# Patient Record
Sex: FEMALE | Race: WHITE | Employment: FULL TIME | URBAN - METROPOLITAN AREA
[De-identification: names, ages, dates, MRNs, and addresses within clinical notes are randomized per-mention and may not be internally consistent; named-entity substitution may affect disease eponyms.]

---

## 2019-06-03 ENCOUNTER — HOSPITAL ENCOUNTER (EMERGENCY)
Age: 29
Discharge: HOME | End: 2019-06-03
Payer: COMMERCIAL

## 2019-06-03 VITALS
TEMPERATURE: 98 F | SYSTOLIC BLOOD PRESSURE: 126 MMHG | BODY MASS INDEX: 24.75 KG/M2 | DIASTOLIC BLOOD PRESSURE: 75 MMHG | HEIGHT: 64 IN | WEIGHT: 145 LBS | RESPIRATION RATE: 15 BRPM | HEART RATE: 65 BPM | OXYGEN SATURATION: 100 %

## 2019-06-03 DIAGNOSIS — R11.0 NAUSEA: ICD-10-CM

## 2019-06-03 DIAGNOSIS — N30.00 ACUTE CYSTITIS WITHOUT HEMATURIA: ICD-10-CM

## 2019-06-03 DIAGNOSIS — F41.9 ANXIETY: Primary | ICD-10-CM

## 2019-06-03 LAB
BACTERIA: ABNORMAL /HPF
BILIRUBIN URINE: NEGATIVE
BLOOD, URINE: NEGATIVE
CLARITY: CLEAR
COLOR: ABNORMAL
EPITHELIAL CELLS, UA: ABNORMAL /HPF
GLUCOSE URINE: NEGATIVE MG/DL
HCG(URINE) PREGNANCY TEST: NEGATIVE
KETONES, URINE: NEGATIVE MG/DL
LEUKOCYTE ESTERASE, URINE: ABNORMAL
MICROSCOPIC EXAMINATION: YES
NITRITE, URINE: NEGATIVE
PH UA: 7 (ref 5–8)
PROTEIN UA: NEGATIVE MG/DL
RBC UA: ABNORMAL /HPF (ref 0–2)
SPECIFIC GRAVITY UA: <=1.005 (ref 1–1.03)
URINE REFLEX TO CULTURE: YES
URINE TYPE: ABNORMAL
UROBILINOGEN, URINE: 0.2 E.U./DL
WBC UA: ABNORMAL /HPF (ref 0–5)

## 2019-06-03 PROCEDURE — 6370000000 HC RX 637 (ALT 250 FOR IP): Performed by: NURSE PRACTITIONER

## 2019-06-03 PROCEDURE — 99283 EMERGENCY DEPT VISIT LOW MDM: CPT

## 2019-06-03 PROCEDURE — 87086 URINE CULTURE/COLONY COUNT: CPT

## 2019-06-03 PROCEDURE — 81001 URINALYSIS AUTO W/SCOPE: CPT

## 2019-06-03 PROCEDURE — 84703 CHORIONIC GONADOTROPIN ASSAY: CPT

## 2019-06-03 RX ORDER — HYDROXYZINE HYDROCHLORIDE 10 MG/1
25 TABLET, FILM COATED ORAL ONCE
Status: COMPLETED | OUTPATIENT
Start: 2019-06-03 | End: 2019-06-03

## 2019-06-03 RX ORDER — NITROFURANTOIN 25; 75 MG/1; MG/1
100 CAPSULE ORAL ONCE
Status: COMPLETED | OUTPATIENT
Start: 2019-06-03 | End: 2019-06-03

## 2019-06-03 RX ORDER — HYDROXYZINE PAMOATE 25 MG/1
25 CAPSULE ORAL ONCE
Status: DISCONTINUED | OUTPATIENT
Start: 2019-06-03 | End: 2019-06-03

## 2019-06-03 RX ORDER — HYDROXYZINE 50 MG/1
25 TABLET, FILM COATED ORAL EVERY 6 HOURS PRN
Qty: 10 TABLET | Refills: 0 | Status: SHIPPED | OUTPATIENT
Start: 2019-06-03 | End: 2019-06-13

## 2019-06-03 RX ORDER — DICYCLOMINE HYDROCHLORIDE 10 MG/1
10 CAPSULE ORAL ONCE
Status: COMPLETED | OUTPATIENT
Start: 2019-06-03 | End: 2019-06-03

## 2019-06-03 RX ORDER — ONDANSETRON 4 MG/1
4 TABLET, ORALLY DISINTEGRATING ORAL EVERY 8 HOURS PRN
Qty: 10 TABLET | Refills: 0 | Status: SHIPPED | OUTPATIENT
Start: 2019-06-03

## 2019-06-03 RX ORDER — DICYCLOMINE HYDROCHLORIDE 10 MG/1
10 CAPSULE ORAL EVERY 6 HOURS PRN
Qty: 10 CAPSULE | Refills: 0 | Status: SHIPPED | OUTPATIENT
Start: 2019-06-03

## 2019-06-03 RX ORDER — NITROFURANTOIN 25; 75 MG/1; MG/1
100 CAPSULE ORAL 2 TIMES DAILY
Qty: 10 CAPSULE | Refills: 0 | Status: SHIPPED | OUTPATIENT
Start: 2019-06-03 | End: 2019-06-08

## 2019-06-03 RX ORDER — ONDANSETRON 4 MG/1
4 TABLET, ORALLY DISINTEGRATING ORAL ONCE
Status: COMPLETED | OUTPATIENT
Start: 2019-06-03 | End: 2019-06-03

## 2019-06-03 RX ADMIN — DICYCLOMINE HYDROCHLORIDE 10 MG: 10 CAPSULE ORAL at 11:14

## 2019-06-03 RX ADMIN — HYDROXYZINE HYDROCHLORIDE 25 MG: 10 TABLET, FILM COATED ORAL at 11:14

## 2019-06-03 RX ADMIN — ONDANSETRON 4 MG: 4 TABLET, ORALLY DISINTEGRATING ORAL at 10:37

## 2019-06-03 RX ADMIN — NITROFURANTOIN MONOHYDRATE/MACROCRYSTALS 100 MG: 75; 25 CAPSULE ORAL at 11:14

## 2019-06-03 NOTE — ED NOTES
Spoke with Ruthie Smith and told her the order is in and please send ASAP.      Jeanie Bui, [address] [facility]  06/03/19 8834

## 2019-06-03 NOTE — ED PROVIDER NOTES
[facility] Emergency Department    CHIEF COMPLAINT  Anxiety (Patient states her Carol Lauren passed away in October and  she hasnt felt \"right\" since. Patient states shes more recluse and \"not feeling like herself\". Tearful upon triage. )      HISTORY OF PRESENT ILLNESS  Silver Stephens is a 34 y.o. female who presents to the ED complaining of \"not feeling like herself. \" Patient is very tearful during history and states \"I don't know what's wrong with me. \" Patient reports since October her grandfather patient is very close with passed away she has been feeling very anxious. Patient reports she does not want to go out or when necessary any friends or family. Patient also reports feeling nauseous and having episodes of diarrhea. Patient denies any history of depression or anxiety. Patient does not take any medications daily. Patient is not currently established with a primary care physician. Patient denies suicidal or homicidal thoughts. Patient states \"I just want to know what's wrong with me and feel like myself. \" Patient reports she just got back from a family vacation and when she went to work this morning she broke down into tears. Patient denies fever, chills, body aches, dizziness, syncope, headache, cough, chest pain, shortness of breath, abdominal pain, emesis, dysuria, hematuria, flank pain, leg swelling, calf pain, palpitations. Patient denies any known aggravating or alleviating factors. No other complaints, modifying factors or associated symptoms. Nursing notes reviewed.    Past Medical History:   Diagnosis Date    Gestational hypertension 11/3/2016     Past Surgical History:   Procedure Laterality Date    ADENOIDECTOMY       Family History   Problem Relation Age of Onset    Depression Mother     High Blood Pressure Mother     Mental Illness Mother     Diabetes Father     Cancer Maternal Grandmother     Early Death Paternal Grandmother      Social History Socioeconomic History    Marital status: Single     Spouse name: Not on file    Number of children: Not on file    Years of education: Not on file    Highest education level: Not on file   Occupational History    Not on file   Social Needs    Financial resource strain: Not on file    Food insecurity:     Worry: Not on file     Inability: Not on file    Transportation needs:     Medical: Not on file     Non-medical: Not on file   Tobacco Use    Smoking status: Former Smoker     Types: Cigarettes    Smokeless tobacco: Never Used   Substance and Sexual Activity    Alcohol use: No    Drug use: No    Sexual activity: Yes   Lifestyle    Physical activity:     Days per week: Not on file     Minutes per session: Not on file    Stress: Not on file   Relationships    Social connections:     Talks on phone: Not on file     Gets together: Not on file     Attends [employer]ism service: Not on file     Active member of club or organization: Not on file     Attends meetings of clubs or organizations: Not on file     Relationship status: Not on file    Intimate partner violence:     Fear of current or ex partner: Not on file     Emotionally abused: Not on file     Physically abused: Not on file     Forced sexual activity: Not on file   Other Topics Concern    Not on file   Social History Narrative    Not on file     No current facility-administered medications for this encounter.       Current Outpatient Medications   Medication Sig Dispense Refill    hydrOXYzine (ATARAX) 50 MG tablet Take 0.5 tablets by mouth every 6 hours as needed for Anxiety 10 tablet 0    ondansetron (ZOFRAN ODT) 4 MG disintegrating tablet Take 1 tablet by mouth every 8 hours as needed for Nausea 10 tablet 0    dicyclomine (BENTYL) 10 MG capsule Take 1 capsule by mouth every 6 hours as needed (cramps) 10 capsule 0    nitrofurantoin, macrocrystal-monohydrate, (MACROBID) 100 MG capsule Take 1 capsule by mouth 2 times daily for 5 days 10 Ketones, Urine Negative Negative mg/dL    Specific Gravity, UA <=1.005 1.005 - 1.030    Blood, Urine Negative Negative    pH, UA 7.0 5.0 - 8.0    Protein, UA Negative Negative mg/dL    Urobilinogen, Urine 0.2 <2.0 E.U./dL    Nitrite, Urine Negative Negative    Leukocyte Esterase, Urine TRACE (A) Negative    Microscopic Examination YES     Urine Reflex to Culture Yes     Urine Type Not Specified    Pregnancy, Urine   Result Value Ref Range    HCG(Urine) Pregnancy Test Negative Detects HCG level >20 MIU/mL   Microscopic Urinalysis   Result Value Ref Range    WBC, UA 10-20 (A) 0 - 5 /HPF    RBC, UA 0-2 0 - 2 /HPF    Epi Cells 0-2 /HPF    Bacteria, UA 1+ (A) /HPF       I estimate there is LOW risk for ACUTE CORONARY SYNDROME, INTRACRANIAL HEMORRHAGE, MALIGNANT DYSRHYTHMIA, MENINGITIS, PNEUMONIA, PULMONARY EMBOLISM, SEPSIS, SUBARACHNOID HEMORRHAGE, SUBDURAL HEMATOMA, STROKE, thus I consider the discharge disposition reasonable. Soham Mello and I have discussed the diagnosis and risks, and we agree with discharging home to follow-up with their primary doctor. We also discussed returning to the Emergency Department immediately if new or worsening symptoms occur. We have discussed the symptoms which are most concerning (e.g., changing or worsening pain, weakness, vomiting, fever) that necessitate immediate return. Final Impression    1. Anxiety    2. Nausea    3. Acute cystitis without hematuria        Blood pressure 126/75, pulse 65, temperature 98 °F (36.7 °C), temperature source Oral, resp. rate 15, height 5' 4\" (1.626 m), weight 145 lb (65.8 kg), last menstrual period 05/28/2019, SpO2 100 %, unknown if currently breastfeeding. DISPOSITION  Patient was discharged to home in good condition.          [address], APRN - CNP  06/03/19 1216

## 2019-06-03 NOTE — ED NOTES
Called pharmacy for the 2nd time and they state we have no capsules and the order needs to be changed to a tablet     Xin Acosta RN  06/03/19 7417

## 2019-06-03 NOTE — LETTER
[facility]  ED  [address] [facility] [address]  Phone: [phone]  Fax: [phone]             Griselda 3, 2019    Patient: Kvng Vincent   YOB: 1990   Date of Visit: 6/3/2019       To Whom It May Concern:    Kee Fang was seen and treated in our emergency department on 6/3/2019. She may return to work on 06/05/19.       Sincerely,     Nurse        Signature:__________________________________

## 2019-06-03 NOTE — ED NOTES
Pt has had some anxiety and depression since the passing of her grandfather. Also has frequent urination.      Itzel Orantes RN  06/03/19 0884

## 2019-06-04 LAB — URINE CULTURE, ROUTINE: NORMAL

## 2021-12-14 ENCOUNTER — INITIAL CONSULT (OUTPATIENT)
Dept: SURGERY | Age: 31
End: 2021-12-14
Payer: COMMERCIAL

## 2021-12-14 VITALS
DIASTOLIC BLOOD PRESSURE: 72 MMHG | SYSTOLIC BLOOD PRESSURE: 118 MMHG | WEIGHT: 128 LBS | BODY MASS INDEX: 21.85 KG/M2 | HEIGHT: 64 IN

## 2021-12-14 DIAGNOSIS — K64.4 EXTERNAL HEMORRHOIDS: Primary | ICD-10-CM

## 2021-12-14 PROCEDURE — 99242 OFF/OP CONSLTJ NEW/EST SF 20: CPT | Performed by: SURGERY

## 2021-12-14 PROCEDURE — G8420 CALC BMI NORM PARAMETERS: HCPCS | Performed by: SURGERY

## 2021-12-14 PROCEDURE — G8484 FLU IMMUNIZE NO ADMIN: HCPCS | Performed by: SURGERY

## 2021-12-14 PROCEDURE — G8427 DOCREV CUR MEDS BY ELIG CLIN: HCPCS | Performed by: SURGERY

## 2021-12-14 RX ORDER — HYDROXYZINE HYDROCHLORIDE 10 MG/1
10 TABLET, FILM COATED ORAL 3 TIMES DAILY PRN
COMMUNITY

## 2021-12-14 NOTE — PROGRESS NOTES
New Patient Via Harjinder Mayberry MD    [address], [address] [facility]  [place], [place] [facility]  [phone]    Debra Allen   YOB: 1990    Date of Visit:  12/14/2021      IBAN Hansen CNP    Chief Complaint: Hemorrhoids    HPI: Patient presents for evaluation of hemorrhoids. She states she has had fairly constant problems with these since her youngest child was born 15 years ago. She has intermittent problems with pain and bleeding. Hard bowel movements exacerbate this and also because bleeding. She states that hemorrhoids are on the outside and will occasionally get flared up and become extremely uncomfortable. She has issues with hygiene as well due to the prominence of the tissue back there    No Known Allergies  Outpatient Medications Marked as Taking for the 12/14/21 encounter (Initial consult) with Richard Navas MD   Medication Sig Dispense Refill    hydrOXYzine (ATARAX) 10 MG tablet Take 10 mg by mouth 3 times daily as needed for Itching Half Tablet as needed.          Past Medical History:   Diagnosis Date    Gestational hypertension 11/3/2016     Past Surgical History:   Procedure Laterality Date    ADENOIDECTOMY       Family History   Problem Relation Age of Onset    Depression Mother     High Blood Pressure Mother     Mental Illness Mother     Diabetes Father     Cancer Maternal Grandmother     Early Death Paternal Grandmother      Social History     Socioeconomic History    Marital status: Single     Spouse name: Not on file    Number of children: Not on file    Years of education: Not on file    Highest education level: Not on file   Occupational History    Not on file   Tobacco Use    Smoking status: Former Smoker     Types: Cigarettes    Smokeless tobacco: Never Used   Substance and Sexual Activity    Alcohol use: No    Drug use: No    Sexual activity: Yes   Other Topics Concern negative  HEMATOLOGIC/LYMPHATIC:  negative  ENDOCRINE:  Negative  NEUROLOGICAL:  Negative  * All other ROS reviewed and negative. PE:  Constitutional:  Well developed, well nourished, no acute distress, non-toxic appearance   Eyes:  PERRL, conjunctiva normal   HENT:  Atraumatic, external ears normal, nose normal. Neck- normal range of motion, no tenderness, supple   Respiratory:  No respiratory distress, normal breath sounds, no rales, no wheezing   Cardiovascular:  Normal rate, normal rhythm  GI: Bowel sounds positive, soft, nontender. On rectal exam she has some external hemorrhoids that the 5 and 7 o'clock position  :  No costovertebral angle tenderness   Integument:  Well hydrated, no rash   Lymphatic:  No lymphadenopathy noted   Neurologic:  Alert & oriented x 3, no focal deficits noted   Psychiatric:  Speech and behavior appropriate       DATA:  N/A      Assessment:  1. External hemorrhoids        Plan: We talked about some conservative management options to help control the hemorrhoids including taking lots of liquids as well as a stool softener. In addition, we did discuss surgical hemorrhoidectomy. I explained the procedure including risks, benefits, and alternatives. Questions were answered and the patient would like to think about things and make a decision.   She will call us if she decides to proceed with surgery

## 2022-01-17 ENCOUNTER — HOSPITAL ENCOUNTER (OUTPATIENT)
Dept: ULTRASOUND IMAGING | Age: 32
Discharge: HOME | End: 2022-01-17
Payer: COMMERCIAL

## 2022-01-17 DIAGNOSIS — N93.9 ABNORMAL UTERINE BLEEDING: ICD-10-CM

## 2022-01-17 PROCEDURE — 76830 TRANSVAGINAL US NON-OB: CPT

## 2022-01-17 PROCEDURE — 76856 US EXAM PELVIC COMPLETE: CPT

## 2022-04-20 ENCOUNTER — HOSPITAL ENCOUNTER (OUTPATIENT)
Dept: WOMENS IMAGING | Age: 32
Discharge: HOME | End: 2022-04-20
Payer: COMMERCIAL

## 2022-04-20 DIAGNOSIS — N63.22 BREAST LUMP ON LEFT SIDE AT 10 O'CLOCK POSITION: ICD-10-CM

## 2022-04-20 PROCEDURE — 77066 DX MAMMO INCL CAD BI: CPT

## 2022-04-20 PROCEDURE — 76642 ULTRASOUND BREAST LIMITED: CPT

## 2022-05-23 ENCOUNTER — TELEPHONE (OUTPATIENT)
Dept: WOMENS IMAGING | Age: 32
End: 2022-05-23

## 2022-05-23 NOTE — TELEPHONE ENCOUNTER
[facility]    [address] [facility], [address] [place] [address]   Phone: [phone]         ULTRASOUND BIOPSY EDUCATION    NAME:  Tracy Henderson OF BIRTH:  1990   MEDICAL RECORD NUMBER:  [id number]   TODAY'S DATE:  5/23/2022    Referring Physician: Dr. Bentley Marin    Procedure: U/S Core Bx    Left Breast    Date of biopsy: 6/6/22    Patient taking blood thinners: no    Medicine allergies: no    Special Instructions: n/a    Biopsy order form faxed to referring MD.          What is an Ultrasound Guided Breast Biopsy? Ultrasound guided breast biopsy is a test that uses ultrasound to find an area of your breast where a tissue sample will be taken. The sample is then looked at under a microscope to check for signs of breast cancer. Why is it done? An Ultrasound biopsy is usually done to check for cancer in a lump or cyst found during a mammogram or ultrasound. Preparing for the test?     * Take your medications as prescribed    You may eat and drink fluids before the test    Take a shower the evening or morning before the biopsy. What happens before the test?  Images are taken to find the exact site to be biopsied.   Your skin is washed with an alcohol prep.   You will be given an injection of medication to numb your breast.   What happens during the test?     Once your breast is numb, a small cut (incision) is made.   Using the imaging, the doctor will guide the needle into the biopsy area.   A sample of breast tissue is taken through the needle.   A small \"Clip\" or Marker is inserted into your breast to livier the biopsy site.   The needle is removed and pressure put on the needle site to stop any bleeding.   A bandage will be placed over the site.   A post mammogram picture will be taken to document the clip placement. How long does the test take? Approximately 60 minutes.   Most of the time is spent finding the area for

## 2022-06-06 ENCOUNTER — HOSPITAL ENCOUNTER (OUTPATIENT)
Dept: WOMENS IMAGING | Age: 32
Discharge: HOME | End: 2022-06-06
Payer: COMMERCIAL

## 2022-06-06 DIAGNOSIS — R92.8 ABNORMAL MAMMOGRAM: ICD-10-CM

## 2022-06-06 DIAGNOSIS — R92.8 ABNORMAL MAMMOGRAM OF LEFT BREAST: ICD-10-CM

## 2022-06-06 PROCEDURE — 88305 TISSUE EXAM BY PATHOLOGIST: CPT

## 2022-06-06 PROCEDURE — 77065 DX MAMMO INCL CAD UNI: CPT

## 2022-06-06 PROCEDURE — A4648 IMPLANTABLE TISSUE MARKER: HCPCS

## 2022-06-06 NOTE — PROGRESS NOTES
Patient in [address] [place] [address] for breast biopsy. Radiologist reviewed procedure with patient, consent signed. Patient tolerated procedure well. Compression held. Site cleansed with chloraprep, steri strips and dry dressing applied. Ice pack provided. Reviewed discharge instructions with patient. Patient verbalized understanding and agreed to contact the Breast Navigator with any questions. Patient was A&Ox3 and steady on feet and discharged to waiting area. The [address]   Discharge Instructions  [facility]  [address]  [address]  Telephone: [phone]   FAX [phone]    NAME:  Emilie Camilo OF BIRTH:  1990  GENDER: female  MEDICAL RECORD NUMBER:  [id number]  TODAY'S DATE:  6/6/2022    Discharge Instructions Breast Center:    Post Breast Biopsy Instructions     [x] You may remove your outer dressing in 24 hours and you may shower. \"Steri-strips\" tape will stay on for 5-7 days. [x] Place a cold pack inside your bra on top of the dressing for at least 3 hours, removing it every 15 minutes for 15 minutes after your biopsy. [x] Wear a firm fitting bra for at least 24 hours after your biopsy, including while you sleep. [x] Place an ice pack inside your bra on top of the dressing for at least 3 hours, removing it every 15 minutes for 15 minutes after your biopsy. [x] You may resume your held medications tomorrow, unless otherwise directed by your physician. [x] Your physician has instructed you to take Tylenol (Acetaminophen) the day of your biopsy for any discomfort. [x] Watch for excessive bleeding. If bleeding occurs apply pressure to site. Watch for signs of infection, increased pain, redness, swelling and heat. If this occurs call your physician. [x] Do not participate in any strenuous exercise for 48 hours after your biopsy and do not lift, pull or push anything over 5-10 lbs.       [x] Results will be available in 2-3 working

## 2022-06-07 ENCOUNTER — TELEPHONE (OUTPATIENT)
Dept: WOMENS IMAGING | Age: 32
End: 2022-06-07

## 2022-06-07 NOTE — TELEPHONE ENCOUNTER
Pathology results complete from breast biopsy. Radiologist confirms concordance. Breast Navigator reviewed results of breast biopsy with patient. Results are negative for any malignancy on the pathology report. Radiologist is recommending a LEFT BREAST ultrasound in 12 months. Patient scheduled on 6/5/23 and verbalized understanding. Path report faxed to referring physician.      Sancho Waddell RN

## 2022-11-10 ENCOUNTER — APPOINTMENT (OUTPATIENT)
Dept: GENERAL RADIOLOGY | Age: 32
End: 2022-11-10
Payer: COMMERCIAL

## 2022-11-10 ENCOUNTER — HOSPITAL ENCOUNTER (EMERGENCY)
Age: 32
Discharge: HOME | End: 2022-11-10
Attending: STUDENT IN AN ORGANIZED HEALTH CARE EDUCATION/TRAINING PROGRAM
Payer: COMMERCIAL

## 2022-11-10 VITALS
TEMPERATURE: 98.1 F | DIASTOLIC BLOOD PRESSURE: 84 MMHG | SYSTOLIC BLOOD PRESSURE: 131 MMHG | HEART RATE: 79 BPM | BODY MASS INDEX: 21 KG/M2 | RESPIRATION RATE: 19 BRPM | WEIGHT: 123 LBS | OXYGEN SATURATION: 100 % | HEIGHT: 64 IN

## 2022-11-10 DIAGNOSIS — R06.02 SHORTNESS OF BREATH: ICD-10-CM

## 2022-11-10 DIAGNOSIS — R05.1 ACUTE COUGH: Primary | ICD-10-CM

## 2022-11-10 LAB
BACTERIA: ABNORMAL /HPF
BILIRUBIN URINE: NEGATIVE
BLOOD, URINE: ABNORMAL
CLARITY: ABNORMAL
COLOR: YELLOW
EPITHELIAL CELLS, UA: ABNORMAL /HPF (ref 0–5)
GLUCOSE URINE: NEGATIVE MG/DL
KETONES, URINE: NEGATIVE MG/DL
LEUKOCYTE ESTERASE, URINE: NEGATIVE
MICROSCOPIC EXAMINATION: YES
MUCUS: ABNORMAL /LPF
NITRITE, URINE: NEGATIVE
PH UA: 6 (ref 5–8)
PROTEIN UA: NEGATIVE MG/DL
RAPID INFLUENZA  B AGN: NEGATIVE
RAPID INFLUENZA A AGN: NEGATIVE
RBC UA: ABNORMAL /HPF (ref 0–4)
SPECIFIC GRAVITY UA: >=1.03 (ref 1–1.03)
URINE REFLEX TO CULTURE: ABNORMAL
URINE TYPE: ABNORMAL
UROBILINOGEN, URINE: 0.2 E.U./DL
WBC UA: ABNORMAL /HPF (ref 0–5)

## 2022-11-10 PROCEDURE — 81001 URINALYSIS AUTO W/SCOPE: CPT

## 2022-11-10 PROCEDURE — 71045 X-RAY EXAM CHEST 1 VIEW: CPT

## 2022-11-10 PROCEDURE — 87804 INFLUENZA ASSAY W/OPTIC: CPT

## 2022-11-10 PROCEDURE — 6360000002 HC RX W HCPCS: Performed by: STUDENT IN AN ORGANIZED HEALTH CARE EDUCATION/TRAINING PROGRAM

## 2022-11-10 PROCEDURE — 96372 THER/PROPH/DIAG INJ SC/IM: CPT

## 2022-11-10 PROCEDURE — 99284 EMERGENCY DEPT VISIT MOD MDM: CPT

## 2022-11-10 RX ORDER — DEXAMETHASONE SODIUM PHOSPHATE 10 MG/ML
8 INJECTION, SOLUTION INTRAMUSCULAR; INTRAVENOUS ONCE
Status: COMPLETED | OUTPATIENT
Start: 2022-11-10 | End: 2022-11-10

## 2022-11-10 RX ORDER — ALBUTEROL SULFATE 90 UG/1
2 AEROSOL, METERED RESPIRATORY (INHALATION) 4 TIMES DAILY PRN
Qty: 18 G | Refills: 0 | Status: SHIPPED | OUTPATIENT
Start: 2022-11-10

## 2022-11-10 RX ORDER — DOXYCYCLINE HYCLATE 100 MG
100 TABLET ORAL 2 TIMES DAILY
Qty: 14 TABLET | Refills: 0 | Status: SHIPPED | OUTPATIENT
Start: 2022-11-10 | End: 2022-11-17

## 2022-11-10 RX ADMIN — DEXAMETHASONE SODIUM PHOSPHATE 8 MG: 10 INJECTION, SOLUTION INTRAMUSCULAR; INTRAVENOUS at 10:50

## 2022-11-10 ASSESSMENT — PAIN SCALES - GENERAL: PAINLEVEL_OUTOF10: 4

## 2022-11-10 ASSESSMENT — PAIN - FUNCTIONAL ASSESSMENT: PAIN_FUNCTIONAL_ASSESSMENT: 0-10

## 2022-11-10 NOTE — ED PROVIDER NOTES
ATTENDING PHYSICIAN NOTE       Date of evaluation: 11/10/2022    Chief Complaint     Shortness of Breath (Patient comes in due to shortness of breath and flu like symptoms that started Monday. Patient seen at Urgent Care yesterday and her covid, flu and strep tests all came back negative. Patient 100% on RA during triage. )      History of Present Illness     [facility] ALAN Aldana is a 29 y.o. female who presents with shortness of breath and flulike symptoms which started on Monday. She was seen at an urgent care yesterday where COVID flu and strep were negative. States that she was started on antibiotics as well as cough medicine. She has had low-grade fevers, chills and body aches. Reports nonproductive cough in addition to nausea and nonbilious nonbloody emesis. Denies any chest pain, chest tightness, wheezing, abdominal pain, dysuria, hematuria, diarrhea, bloody stools, syncope, dizziness. Denies any vaginal bleeding or discharge. Review of Systems     Review of Systems   All other systems reviewed and are negative. Past Medical, Surgical, Family, and Social History     She has a past medical history of Gestational hypertension. She has a past surgical history that includes Adenoidectomy and US BREAST BIOPSY W LOC DEVICE 1ST LESION LEFT (Left, 6/6/2022). Her family history includes Cancer in her maternal grandmother; Depression in her mother; Diabetes in her father; Early Death in her paternal grandmother; High Blood Pressure in her mother; Mental Illness in her mother. She reports that she has quit smoking. Her smoking use included cigarettes. She has never used smokeless tobacco. She reports that she does not drink alcohol and does not use drugs.     Medications     Discharge Medication List as of 11/10/2022 11:14 AM        CONTINUE these medications which have NOT CHANGED    Details   hydrOXYzine (ATARAX) 10 MG tablet Take 10 mg by mouth 3 times daily as needed for Itching Half Tablet as needed. Historical Med      ondansetron (ZOFRAN ODT) 4 MG disintegrating tablet Take 1 tablet by mouth every 8 hours as needed for Nausea, Disp-10 tablet, R-0Print      dicyclomine (BENTYL) 10 MG capsule Take 1 capsule by mouth every 6 hours as needed (cramps), Disp-10 capsule, R-0Print             Allergies     She has No Known Allergies. Physical Exam     INITIAL VITALS: BP: 131/84, Temp: 98.1 °F (36.7 °C), Heart Rate: 79, Resp: 19, SpO2: 100 %   Physical Exam  Vitals and nursing note reviewed. Constitutional:       General: She is not in acute distress. Appearance: She is well-developed. HENT:      Head: Normocephalic and atraumatic. Mouth/Throat:      Pharynx: Oropharynx is clear. No pharyngeal swelling or oropharyngeal exudate. Eyes:      Extraocular Movements: Extraocular movements intact. Pupils: Pupils are equal, round, and reactive to light. Neck:      Vascular: No JVD. Cardiovascular:      Rate and Rhythm: Normal rate and regular rhythm. Heart sounds: No murmur heard. No friction rub. No gallop. Pulmonary:      Effort: Pulmonary effort is normal. No tachypnea. Breath sounds: Normal breath sounds. No decreased breath sounds, wheezing, rhonchi or rales. Chest:      Chest wall: No tenderness or crepitus. Abdominal:      Palpations: Abdomen is soft. There is no hepatomegaly or splenomegaly. Tenderness: There is no abdominal tenderness. There is no guarding or rebound. Musculoskeletal:         General: Normal range of motion. Cervical back: Normal range of motion and neck supple. Right lower leg: No tenderness. Left lower leg: No tenderness. Skin:     General: Skin is warm. Capillary Refill: Capillary refill takes less than 2 seconds. Findings: No ecchymosis or erythema. Neurological:      General: No focal deficit present. Mental Status: She is alert. Cranial Nerves: No cranial nerve deficit. Motor: No weakness. Psychiatric:         Mood and Affect: Mood normal.         Behavior: Behavior normal.       Diagnostic Results       RADIOLOGY:  XR CHEST PORTABLE   Final Result   Query bronchitis/airways disease. LABS:   Results for orders placed or performed during the hospital encounter of 11/10/22   Rapid influenza A/B antigens    Specimen: Nasopharyngeal   Result Value Ref Range    Rapid Influenza A Ag Negative Negative    Rapid Influenza B Ag Negative Negative   Urinalysis with Reflex to Culture    Specimen: Urine   Result Value Ref Range    Color, UA Yellow Straw/Yellow    Clarity, UA TURBID (A) Clear    Glucose, Ur Negative Negative mg/dL    Bilirubin Urine Negative Negative    Ketones, Urine Negative Negative mg/dL    Specific Gravity, UA >=1.030 1.005 - 1.030    Blood, Urine MODERATE (A) Negative    pH, UA 6.0 5.0 - 8.0    Protein, UA Negative Negative mg/dL    Urobilinogen, Urine 0.2 <2.0 E.U./dL    Nitrite, Urine Negative Negative    Leukocyte Esterase, Urine Negative Negative    Microscopic Examination YES     Urine Type NotGiven     Urine Reflex to Culture Not Indicated    Microscopic Urinalysis   Result Value Ref Range    Mucus, UA 3+ (A) None Seen /LPF    WBC, UA 0-2 0 - 5 /HPF    RBC, UA 5-10 (A) 0 - 4 /HPF    Epithelial Cells, UA 21-50 (A) 0 - 5 /HPF    Bacteria, UA 1+ (A) None Seen /HPF       ED BEDSIDE ULTRASOUND:  No results found. RECENT VITALS:  BP: 131/84,Temp: 98.1 °F (36.7 °C), Heart Rate: 79, Resp: 19, SpO2: 100 %     Procedures         ED Course     Nursing Notes, Past Medical Hx, Past Surgical Hx, Social Hx,Allergies, and Family Hx were reviewed.          patient was given the following medications:  Orders Placed This Encounter   Medications    dexamethasone (PF) (DECADRON) injection 8 mg    doxycycline hyclate (VIBRA-TABS) 100 MG tablet     Sig: Take 1 tablet by mouth 2 times daily for 7 days     Dispense:  14 tablet     Refill:  0    albuterol sulfate HFA (VENTOLIN HFA) 108 (90 Base) MCG/ACT inhaler     Sig: Inhale 2 puffs into the lungs 4 times daily as needed for Wheezing     Dispense:  18 g     Refill:  0       CONSULTS:  None    MEDICAL DECISIONMAKING / ASSESSMENT / Lamar Monea is a 29 y.o. female who presents with cough and shortness of breath. She presented normotensive, afebrile, heart rate of 79, respiratory rate of 19 and satting at 100% on room air. Please see exam above. Given history and exam I am concerned for underlying influenza versus pneumonia. Her COVID test was negative yesterday. She has no decreased breath sounds and work of breathing is normal.  Doubt underlying acute surgical abdomen in absence of abdominal tenderness, rebound or guarding. She has no ear pain and TMs with no erythema or effusion to suggest acute otitis media. She has no oropharyngeal swelling, tonsillar exudate to suggest strep pharyngitis or peritonsillar abscess. I obtained flu swab as well as chest x-ray as noted below. Rapid flu was negative  UA negative for infection    CXR with no acute cardiopulmonary changes. Given workup above presentation likely 2/2 bronchitis. She was provided abx and albuterol. All questions and concerns addressed. Strict return precautions reviewed. Clinical Impression     1. Acute cough    2.  Shortness of breath        Disposition     PATIENT REFERRED TO:  Tre Ferrer, [address] [address]  [phone]          DISCHARGE MEDICATIONS:  Discharge Medication List as of 11/10/2022 11:14 AM        START taking these medications    Details   doxycycline hyclate (VIBRA-TABS) 100 MG tablet Take 1 tablet by mouth 2 times daily for 7 days, Disp-14 tablet, R-0Normal      albuterol sulfate HFA (VENTOLIN HFA) 108 (90 Base) MCG/ACT inhaler Inhale 2 puffs into the lungs 4 times daily as needed for Wheezing, Disp-18 g, R-0Normal             DISPOSITION Decision To Discharge 11/10/2022 10:41:55 AM          Rosita Estrada Donovan Dorsey MD  11/14/22 9891

## 2023-06-05 ENCOUNTER — HOSPITAL ENCOUNTER (OUTPATIENT)
Dept: WOMENS IMAGING | Age: 33
Discharge: HOME | End: 2023-06-05
Payer: COMMERCIAL

## 2023-06-05 DIAGNOSIS — N63.0 MASS OF BREAST, UNSPECIFIED LATERALITY: ICD-10-CM

## 2023-06-05 PROCEDURE — 76642 ULTRASOUND BREAST LIMITED: CPT

## 2024-07-15 ENCOUNTER — HOSPITAL ENCOUNTER (OUTPATIENT)
Dept: WOMENS IMAGING | Age: 34
Discharge: HOME | End: 2024-07-15

## 2024-07-15 VITALS — HEIGHT: 64 IN | BODY MASS INDEX: 24.75 KG/M2 | WEIGHT: 145 LBS

## 2024-07-15 DIAGNOSIS — N63.20 MASS OF LEFT BREAST, UNSPECIFIED QUADRANT: ICD-10-CM

## 2024-09-03 ENCOUNTER — OFFICE VISIT (OUTPATIENT)
Age: 34
End: 2024-09-03

## 2024-09-03 ENCOUNTER — HOSPITAL ENCOUNTER (OUTPATIENT)
Dept: GENERAL RADIOLOGY | Age: 34
Discharge: HOME | End: 2024-09-03
Payer: COMMERCIAL

## 2024-09-03 VITALS
TEMPERATURE: 98 F | HEART RATE: 73 BPM | SYSTOLIC BLOOD PRESSURE: 122 MMHG | OXYGEN SATURATION: 98 % | DIASTOLIC BLOOD PRESSURE: 74 MMHG

## 2024-09-03 DIAGNOSIS — S93.501A SPRAIN OF GREAT TOE OF RIGHT FOOT, INITIAL ENCOUNTER: Primary | ICD-10-CM

## 2024-09-03 DIAGNOSIS — S93.501A SPRAIN OF GREAT TOE OF RIGHT FOOT, INITIAL ENCOUNTER: ICD-10-CM

## 2024-09-03 PROCEDURE — 73630 X-RAY EXAM OF FOOT: CPT

## 2024-09-03 RX ORDER — IBUPROFEN 800 MG/1
800 TABLET, FILM COATED ORAL 2 TIMES DAILY PRN
Qty: 60 TABLET | Refills: 0 | Status: SHIPPED | OUTPATIENT
Start: 2024-09-03

## 2024-09-03 NOTE — PROGRESS NOTES
Dania Aldana (: 1990) is a 34 y.o. female, Established patient, here for evaluation of the following chief complaint(s):  Injury (Right foot and big toe injury/Pt states she was playing soccer and kicked another person and then dropped to the ground/Pt denies hearing any popping, cracking, etc/Pt states hurt to put weight on foot and hurts to sleep)      ASSESSMENT/PLAN:    ICD-10-CM    1. Sprain of great toe of right foot, initial encounter  S93.501A XR FOOT RIGHT (MIN 3 VIEWS)     Tye Garza MD, Orthopedic Surgery (Foot, Ankle), [facility] ORTH[employer] SUPPLIES Post Op Shoe, Unisex - Right;  (M5.5-7/F6.5-8)          Given patient presentation and nature of injury with exam findings of right great toe swelling and erythema, x-ray obtained to rule out concerns of fractures or dislocations  Initial x-ray impression: Negative  Radiology impression:  Negative  Placed patient in post op shoe  RICE protocol  Take ibuprofen, up to 600 mg four times per day every day with food for the next 3-5 days, then as needed and directed on the bottle for continued pain.You may alternate this with up to 1000 mg of acetaminophen (Tylenol), every six hours. Do not take more than 4000 mg of acetaminophen from all sources in a 24-hour period.   Ortho referral provided    Discussed PCP follow up for persisting or worsening symptoms, or to return to the clinic if unable to obtain PCP follow up for worsening symptoms.    The patient tolerated their visit well. The patient and/or the family were informed of the results of any tests, a time was given to answer questions, a plan was proposed and they agreed with plan. Reviewed AVS with treatment instructions and answered questions - pt/family expresses understanding and agreement with the discussed treatment plan and AVS instructions.      SUBJECTIVE/OBJECTIVE:  HPI:   34 y.o. female presents for complaint of right great toe pain from kicking

## 2024-09-03 NOTE — PATIENT INSTRUCTIONS
Toe sprain/possible fracture  Wear provided post op shoe for comfort  RICE-rest,ice, compress and elevate  Order for Xray provided- I will call you with results  Ortho referral provided  Prescription for ibuprofen sent to [facility]